# Patient Record
Sex: FEMALE | Race: WHITE | NOT HISPANIC OR LATINO | URBAN - METROPOLITAN AREA
[De-identification: names, ages, dates, MRNs, and addresses within clinical notes are randomized per-mention and may not be internally consistent; named-entity substitution may affect disease eponyms.]

---

## 2018-01-22 PROBLEM — Z00.00 ENCOUNTER FOR PREVENTIVE HEALTH EXAMINATION: Status: ACTIVE | Noted: 2018-01-22

## 2018-01-23 ENCOUNTER — OUTPATIENT (OUTPATIENT)
Dept: OUTPATIENT SERVICES | Facility: HOSPITAL | Age: 43
LOS: 1 days | Discharge: HOME | End: 2018-01-23

## 2018-01-23 ENCOUNTER — APPOINTMENT (OUTPATIENT)
Dept: OBGYN | Facility: CLINIC | Age: 43
End: 2018-01-23
Payer: COMMERCIAL

## 2018-01-23 VITALS — BODY MASS INDEX: 30.22 KG/M2 | HEIGHT: 64 IN | WEIGHT: 177 LBS

## 2018-01-23 LAB
BILIRUB UR QL STRIP: NORMAL
GLUCOSE UR-MCNC: NORMAL
HCG UR QL: NORMAL EU/DL
HGB UR QL STRIP.AUTO: NORMAL
KETONES UR-MCNC: NORMAL
LEUKOCYTE ESTERASE UR QL STRIP: NORMAL
NITRITE UR QL STRIP: NORMAL
PH UR STRIP: 8
PROT UR STRIP-MCNC: NORMAL
SP GR UR STRIP: 1

## 2018-01-23 PROCEDURE — 99386 PREV VISIT NEW AGE 40-64: CPT

## 2018-01-23 PROCEDURE — 81003 URINALYSIS AUTO W/O SCOPE: CPT | Mod: QW

## 2018-01-24 DIAGNOSIS — Z01.419 ENCOUNTER FOR GYNECOLOGICAL EXAMINATION (GENERAL) (ROUTINE) WITHOUT ABNORMAL FINDINGS: ICD-10-CM

## 2019-04-04 ENCOUNTER — LABORATORY RESULT (OUTPATIENT)
Age: 44
End: 2019-04-04

## 2019-04-04 ENCOUNTER — OUTPATIENT (OUTPATIENT)
Dept: OUTPATIENT SERVICES | Facility: HOSPITAL | Age: 44
LOS: 1 days | Discharge: HOME | End: 2019-04-04

## 2019-04-04 ENCOUNTER — APPOINTMENT (OUTPATIENT)
Dept: OBGYN | Facility: CLINIC | Age: 44
End: 2019-04-04
Payer: COMMERCIAL

## 2019-04-04 VITALS — BODY MASS INDEX: 28.32 KG/M2 | HEIGHT: 65 IN | WEIGHT: 170 LBS

## 2019-04-04 PROCEDURE — 99396 PREV VISIT EST AGE 40-64: CPT

## 2019-04-05 DIAGNOSIS — Z01.419 ENCOUNTER FOR GYNECOLOGICAL EXAMINATION (GENERAL) (ROUTINE) WITHOUT ABNORMAL FINDINGS: ICD-10-CM

## 2020-04-07 ENCOUNTER — APPOINTMENT (OUTPATIENT)
Dept: OBGYN | Facility: CLINIC | Age: 45
End: 2020-04-07

## 2020-08-20 ENCOUNTER — APPOINTMENT (OUTPATIENT)
Dept: OBGYN | Facility: CLINIC | Age: 45
End: 2020-08-20
Payer: COMMERCIAL

## 2020-08-20 ENCOUNTER — LABORATORY RESULT (OUTPATIENT)
Age: 45
End: 2020-08-20

## 2020-08-20 ENCOUNTER — TRANSCRIPTION ENCOUNTER (OUTPATIENT)
Age: 45
End: 2020-08-20

## 2020-08-20 VITALS — HEIGHT: 64 IN | BODY MASS INDEX: 30.56 KG/M2 | TEMPERATURE: 97.8 F | WEIGHT: 179 LBS

## 2020-08-20 PROCEDURE — 99396 PREV VISIT EST AGE 40-64: CPT

## 2020-08-20 PROCEDURE — 81003 URINALYSIS AUTO W/O SCOPE: CPT | Mod: QW

## 2020-08-21 DIAGNOSIS — N39.0 URINARY TRACT INFECTION, SITE NOT SPECIFIED: ICD-10-CM

## 2020-12-02 ENCOUNTER — NON-APPOINTMENT (OUTPATIENT)
Age: 45
End: 2020-12-02

## 2020-12-02 PROBLEM — N39.0 ACUTE URINARY TRACT INFECTION: Status: ACTIVE | Noted: 2020-12-02

## 2020-12-02 RX ORDER — NITROFURANTOIN (MONOHYDRATE/MACROCRYSTALS) 25; 75 MG/1; MG/1
100 CAPSULE ORAL
Qty: 14 | Refills: 0 | Status: ACTIVE | COMMUNITY
Start: 2020-12-02 | End: 1900-01-01

## 2021-08-30 ENCOUNTER — APPOINTMENT (OUTPATIENT)
Dept: OBGYN | Facility: CLINIC | Age: 46
End: 2021-08-30

## 2021-09-08 ENCOUNTER — LABORATORY RESULT (OUTPATIENT)
Age: 46
End: 2021-09-08

## 2021-09-13 ENCOUNTER — APPOINTMENT (OUTPATIENT)
Dept: OBGYN | Facility: CLINIC | Age: 46
End: 2021-09-13

## 2021-09-13 ENCOUNTER — APPOINTMENT (OUTPATIENT)
Dept: OBGYN | Facility: CLINIC | Age: 46
End: 2021-09-13
Payer: COMMERCIAL

## 2021-09-13 LAB
BILIRUB UR QL STRIP: NORMAL
GLUCOSE UR-MCNC: NORMAL
HCG UR QL: NORMAL EU/DL
HGB UR QL STRIP.AUTO: NORMAL
KETONES UR-MCNC: NORMAL
LEUKOCYTE ESTERASE UR QL STRIP: NORMAL
NITRITE UR QL STRIP: NORMAL
PH UR STRIP: 6
PROT UR STRIP-MCNC: NORMAL
SP GR UR STRIP: 1.01

## 2021-09-13 PROCEDURE — 99396 PREV VISIT EST AGE 40-64: CPT

## 2022-09-14 ENCOUNTER — LABORATORY RESULT (OUTPATIENT)
Age: 47
End: 2022-09-14

## 2022-09-15 ENCOUNTER — APPOINTMENT (OUTPATIENT)
Dept: OBGYN | Facility: CLINIC | Age: 47
End: 2022-09-15

## 2022-09-15 VITALS — BODY MASS INDEX: 30.66 KG/M2 | TEMPERATURE: 98 F | WEIGHT: 184 LBS | HEIGHT: 65 IN

## 2022-09-15 DIAGNOSIS — N92.6 IRREGULAR MENSTRUATION, UNSPECIFIED: ICD-10-CM

## 2022-09-15 DIAGNOSIS — N95.1 MENOPAUSAL AND FEMALE CLIMACTERIC STATES: ICD-10-CM

## 2022-09-15 PROCEDURE — 99396 PREV VISIT EST AGE 40-64: CPT

## 2022-11-10 ENCOUNTER — NON-APPOINTMENT (OUTPATIENT)
Age: 47
End: 2022-11-10

## 2023-03-17 RX ORDER — VENLAFAXINE HYDROCHLORIDE 37.5 MG/1
37.5 CAPSULE, EXTENDED RELEASE ORAL
Qty: 90 | Refills: 1 | Status: ACTIVE | COMMUNITY
Start: 2022-09-15 | End: 1900-01-01

## 2023-08-16 ENCOUNTER — NON-APPOINTMENT (OUTPATIENT)
Age: 48
End: 2023-08-16

## 2023-09-17 ENCOUNTER — LABORATORY RESULT (OUTPATIENT)
Age: 48
End: 2023-09-17

## 2023-09-18 ENCOUNTER — APPOINTMENT (OUTPATIENT)
Dept: OBGYN | Facility: CLINIC | Age: 48
End: 2023-09-18
Payer: COMMERCIAL

## 2023-09-18 VITALS — BODY MASS INDEX: 29.66 KG/M2 | HEIGHT: 65 IN | WEIGHT: 178 LBS | TEMPERATURE: 97 F

## 2023-09-18 DIAGNOSIS — N39.490 STRESS INCONTINENCE (FEMALE) (MALE): ICD-10-CM

## 2023-09-18 DIAGNOSIS — N39.3 STRESS INCONTINENCE (FEMALE) (MALE): ICD-10-CM

## 2023-09-18 DIAGNOSIS — Z01.419 ENCOUNTER FOR GYNECOLOGICAL EXAMINATION (GENERAL) (ROUTINE) W/OUT ABNORMAL FINDINGS: ICD-10-CM

## 2023-09-18 DIAGNOSIS — E03.9 HYPOTHYROIDISM, UNSPECIFIED: ICD-10-CM

## 2023-09-18 PROCEDURE — 99396 PREV VISIT EST AGE 40-64: CPT

## 2023-09-18 RX ORDER — VENLAFAXINE HYDROCHLORIDE 37.5 MG/1
37.5 CAPSULE, EXTENDED RELEASE ORAL
Qty: 90 | Refills: 3 | Status: ACTIVE | COMMUNITY
Start: 2023-09-18 | End: 1900-01-01

## 2024-06-28 ENCOUNTER — NON-APPOINTMENT (OUTPATIENT)
Age: 49
End: 2024-06-28

## 2024-09-22 ENCOUNTER — LABORATORY RESULT (OUTPATIENT)
Age: 49
End: 2024-09-22

## 2024-09-23 ENCOUNTER — APPOINTMENT (OUTPATIENT)
Dept: OBGYN | Facility: CLINIC | Age: 49
End: 2024-09-23
Payer: COMMERCIAL

## 2024-09-23 VITALS — HEIGHT: 65 IN | BODY MASS INDEX: 29.99 KG/M2 | TEMPERATURE: 96.2 F | WEIGHT: 180 LBS

## 2024-09-23 DIAGNOSIS — N92.6 IRREGULAR MENSTRUATION, UNSPECIFIED: ICD-10-CM

## 2024-09-23 DIAGNOSIS — Z01.419 ENCOUNTER FOR GYNECOLOGICAL EXAMINATION (GENERAL) (ROUTINE) W/OUT ABNORMAL FINDINGS: ICD-10-CM

## 2024-09-23 DIAGNOSIS — E03.9 HYPOTHYROIDISM, UNSPECIFIED: ICD-10-CM

## 2024-09-23 LAB
APPEARANCE: CLEAR
BILIRUBIN URINE: NEGATIVE
BLOOD URINE: NEGATIVE
COLOR: YELLOW
GLUCOSE QUALITATIVE U: NEGATIVE
KETONES URINE: NEGATIVE
LEUKOCYTE ESTERASE URINE: NEGATIVE
NITRITE URINE: NEGATIVE
PH URINE: 5.5
PROTEIN URINE: NEGATIVE
SPECIFIC GRAVITY URINE: 1.01
UROBILINOGEN URINE: 0.2 (ref 0.2–?)

## 2024-09-23 PROCEDURE — 99396 PREV VISIT EST AGE 40-64: CPT

## 2024-09-23 NOTE — DISCUSSION/SUMMARY
[FreeTextEntry1] : 49 YEAR OLD FEMALE LMP 1/4/2024 ( AFTER ALMOST ONE YEAR) PRESENTS FOR WELL  WOMAN ANNUAL GYNECOLOGIC EXAMINATION AND PAP.  LAST SEEN FOR WELL WOMAN VISIT 9/18/2023; PAP AND HPV HR TESTING DONE AT THAT TIME WERE NEGATIVE.   NO NEW MEDICAL HISTORY SINCE LAST VISIT. MEDICATIONS; VELAFAXIN 37.5 MG ; UNITHYROID; LEIOTHYRONINE MEDICATION ALLERGIES; PENICILLIN CAUSES ITCHING AND RASH ROS;BMS-NORMAL; NO FAM HISTORY OF COLON CANCER; LAST COLONOSCOPY [id number]          NO URINARY COMPL;AINTS          SEXUALLY ACTIVE WITHOUT COMPLAINTS BREASTS; DOES DO OCCASIONAL BREAST SELF EXAMNINATION                   LAST MAMMOGRAPHY 9/1/2023 BIRADS I                   NO FAM HISTORY OF BREAST CANCER +WALKS; NO MULTIVITAMINS; + DAIRY; NO TOBACCO OR VAPING  PE;/80 TEMP 96.2;WEIGHT 180 LB HEIGHT 5'5"       BREASTS; NO MASSES OR DISCHARGES       ABODMEN;SOFT, NO MASSES ; NO TENDERNESS TO PALPATION; LOW TRANSVERSE SCAR       PELVIC NORMAL EXTERNAL GENITALIA                     NORMAL URETHRAL MEATUS                     NORMAL VAGINA WITHOUT LESION                     NORMAL CERVIX WITHOUT LESION                     ANTEVERTED NORMAL UTERUS ON BIMANUAL EXAMINATION                     NO PALPABLE ADNEXAL MASSES  IMP; WELL WOMAN          HYPOTHYROID           PLAN; THIN PREP PAP WITH HR HPV TESTING DONE--PT TO CALL IN 2 WKS FOR RESULTS             BREAST SELF EXAMINATION MONTHLY CONTINUED TO BE STRONGLY [id number]             ANNUAL MAMMOGRAPHY ADVISED AND REFERRAL SCRIPT GIVEN            BONE DENSITY, DEXA, TESTING ADVISED AND SCRIPT GIVEN TO BE DONE            RETURN TO OFFICE ONE YEAR OR PRN

## 2025-07-09 ENCOUNTER — NON-APPOINTMENT (OUTPATIENT)
Age: 50
End: 2025-07-09